# Patient Record
Sex: FEMALE | Race: BLACK OR AFRICAN AMERICAN | NOT HISPANIC OR LATINO
[De-identification: names, ages, dates, MRNs, and addresses within clinical notes are randomized per-mention and may not be internally consistent; named-entity substitution may affect disease eponyms.]

---

## 2019-07-21 ENCOUNTER — RESULT REVIEW (OUTPATIENT)
Age: 54
End: 2019-07-21

## 2019-09-18 ENCOUNTER — TRANSCRIPTION ENCOUNTER (OUTPATIENT)
Age: 54
End: 2019-09-18

## 2020-04-26 ENCOUNTER — MESSAGE (OUTPATIENT)
Age: 55
End: 2020-04-26

## 2020-05-08 ENCOUNTER — APPOINTMENT (OUTPATIENT)
Dept: DISASTER EMERGENCY | Facility: CLINIC | Age: 55
End: 2020-05-08

## 2020-05-09 ENCOUNTER — TRANSCRIPTION ENCOUNTER (OUTPATIENT)
Age: 55
End: 2020-05-09

## 2020-05-09 LAB
SARS-COV-2 IGG SERPL IA-ACNC: 103 INDEX
SARS-COV-2 IGG SERPL QL IA: POSITIVE

## 2021-08-18 PROBLEM — Z00.00 ENCOUNTER FOR PREVENTIVE HEALTH EXAMINATION: Status: ACTIVE | Noted: 2021-08-18

## 2022-06-06 ENCOUNTER — RESULT REVIEW (OUTPATIENT)
Age: 57
End: 2022-06-06

## 2023-06-19 ENCOUNTER — NON-APPOINTMENT (OUTPATIENT)
Age: 58
End: 2023-06-19

## 2023-06-19 ENCOUNTER — APPOINTMENT (OUTPATIENT)
Dept: ORTHOPEDIC SURGERY | Facility: CLINIC | Age: 58
End: 2023-06-19
Payer: COMMERCIAL

## 2023-06-19 VITALS — WEIGHT: 159 LBS | BODY MASS INDEX: 28.17 KG/M2 | HEIGHT: 63 IN

## 2023-06-19 DIAGNOSIS — M25.462 EFFUSION, LEFT KNEE: ICD-10-CM

## 2023-06-19 DIAGNOSIS — M25.569 PAIN IN UNSPECIFIED KNEE: ICD-10-CM

## 2023-06-19 PROCEDURE — 99204 OFFICE O/P NEW MOD 45 MIN: CPT

## 2023-06-19 PROCEDURE — 73560 X-RAY EXAM OF KNEE 1 OR 2: CPT | Mod: LT

## 2023-06-19 RX ORDER — MELOXICAM 15 MG/1
15 TABLET ORAL DAILY
Qty: 30 | Refills: 1 | Status: ACTIVE | COMMUNITY
Start: 2023-06-19 | End: 1900-01-01

## 2023-06-19 NOTE — DISCUSSION/SUMMARY
[de-identified] : 58-year-old female with acute left knee injury with moderate effusion and significant mechanical symptoms.  There is mild degenerative joint arthritis on x-ray.  I suggested we obtain an MRI of the left knee for further evaluation.  If meniscus tear is found, consideration will be given towards arthroscopy.  I recommended a course of physical therapy, and referral was given.  She may continue with the ice, bracing as needed, Tylenol or anti-inflammatories as needed.  Discussed possible cortisone injection at follow-up visits.\par Follow-up in 6 weeks

## 2023-06-19 NOTE — PHYSICAL EXAM
[Normal] : Gait: normal [de-identified] : General: No acute distress\par Mental: Alert and oriented x3\par Eyes: Conjunctivitis not seen\par Chest: Symmetric chest rise, no audible wheezing\par Skin: Bilateral lower extremities absent from rashes and ulcers\par Abdomen: No distention\par \par Left knee:\par Skin: Clean, dry, intact \par Inspection: No obvious malalignment, no masses, no swelling, moderate effusion.  Posterior swelling.\par Tenderness: Positive MJLT. no LJLT.  Posterior tenderness.  No tenderness over the medial and lateral patella facets. No ttp medial/lateral epicondyle, patella tendon, tibial tubercle, pes anserinus, or proximal fibula. \par ROM: 0 to 120° pain with deep flexion\par Stability: Stable to varus and valgus, negative lachman. Negative anterior/posterior drawer. \par Additional tests: Positive McMurrays test, positive Steinmann, Negative patellar grind test.  \par Strength: 5/5 Q/H/TA/GS/EHL, no atrophy \par Neuro: Sensation intact to light touch throughout in dp/sp/tib/gilda/saph nerve distributions\par Pulses: 2+ DP/PT pulses.\par  [de-identified] : Left knee x-rays performed at [facility] urgent care on 6/11 include AP, lateral, and oblique.\par Images show neutral alignment with mild narrowing of the medial joint space and small medial osteophytes, well-maintained lateral joint space, patella at appropriate height.\par Merchant view of the left knee today shows a patella in a central position with patellofemoral narrowing.

## 2023-06-19 NOTE — HISTORY OF PRESENT ILLNESS
[de-identified] : 58-year-old female presents with severe left knee pain over the past 1 week.  This began when she was jumping cheering on her son during a basketball game.  She felt the left knee give out, and developed acute onset swelling.  She has pain along the medial and posterior aspects, associated with buckling and catching sensations.  There is persistent swelling.  She has tried applying ice and using a knee brace.  Pain is increased with walking and prolonged sitting.  There is no pain radiating towards the feet, and there is no numbness, tingling, or weakness.  She went to [facility] urgent care on 6/11 where x-rays of the knee were taken.

## 2023-06-20 PROBLEM — Z00.00 ENCOUNTER FOR PREVENTIVE HEALTH EXAMINATION: Noted: 2023-06-20

## 2023-06-23 ENCOUNTER — RESULT REVIEW (OUTPATIENT)
Age: 58
End: 2023-06-23

## 2023-06-23 ENCOUNTER — OUTPATIENT (OUTPATIENT)
Dept: OUTPATIENT SERVICES | Facility: HOSPITAL | Age: 58
LOS: 1 days | End: 2023-06-23
Payer: COMMERCIAL

## 2023-06-23 ENCOUNTER — APPOINTMENT (OUTPATIENT)
Dept: MRI IMAGING | Facility: IMAGING CENTER | Age: 58
End: 2023-06-23
Payer: COMMERCIAL

## 2023-06-23 DIAGNOSIS — M25.569 PAIN IN UNSPECIFIED KNEE: ICD-10-CM

## 2023-06-23 PROCEDURE — 73721 MRI JNT OF LWR EXTRE W/O DYE: CPT | Mod: 26,LT

## 2023-06-23 PROCEDURE — 73721 MRI JNT OF LWR EXTRE W/O DYE: CPT

## 2023-06-28 ENCOUNTER — APPOINTMENT (OUTPATIENT)
Dept: ORTHOPEDIC SURGERY | Facility: CLINIC | Age: 58
End: 2023-06-28
Payer: COMMERCIAL

## 2023-06-28 PROCEDURE — 99441: CPT

## 2023-07-19 ENCOUNTER — APPOINTMENT (OUTPATIENT)
Dept: ORTHOPEDIC SURGERY | Facility: CLINIC | Age: 58
End: 2023-07-19
Payer: COMMERCIAL

## 2023-07-19 VITALS — BODY MASS INDEX: 28.88 KG/M2 | WEIGHT: 163 LBS | HEIGHT: 63 IN

## 2023-07-19 DIAGNOSIS — S83.242A OTHER TEAR OF MEDIAL MENISCUS, CURRENT INJURY, LEFT KNEE, INITIAL ENCOUNTER: ICD-10-CM

## 2023-07-19 DIAGNOSIS — S83.512A SPRAIN OF ANTERIOR CRUCIATE LIGAMENT OF LEFT KNEE, INITIAL ENCOUNTER: ICD-10-CM

## 2023-07-19 PROCEDURE — 99214 OFFICE O/P EST MOD 30 MIN: CPT

## 2023-07-20 NOTE — DISCUSSION/SUMMARY
[de-identified] : 58-year-old female 1 month status post pivot injury with a left knee anterior cruciate ligament tear.  We discussed treatment options at length.  We discussed nonoperative care with activity modification physical therapy.  We discussed surgical treatment with anterior cruciate ligament reconstruction.  Given her age and activity level would like to proceed with attempted nonoperative management.  She will start a course of physical therapy at this time.  She will follow-up in 6 to 8 weeks.  If not improved we will again discuss the possibility of ACL reconstruction.  She expressed agreement with the plan

## 2023-07-20 NOTE — CONSULT LETTER
[Dear  ___] : Dear  [unfilled], [Consult Letter:] : I had the pleasure of evaluating your patient, [unfilled]. [Please see my note below.] : Please see my note below. [Consult Closing:] : Thank you very much for allowing me to participate in the care of this patient.  If you have any questions, please do not hesitate to contact me. [Sincerely,] : Sincerely, [FreeTextEntry3] : DR. [facility]

## 2023-07-20 NOTE — PHYSICAL EXAM
[de-identified] : General Exam\par \par Well developed, well nourished\par No apparent distress\par Oriented to person, place, and time\par Mood: Normal\par Affect: Normal\par Balance and coordination: Normal\par Gait: Normal\par \par left knee exam\par \par Skin: Clean, dry, intact\par Inspection: No obvious malalignment, no masses, no swelling, mild effusion.\par Tenderness: + MJLT. No LJLT. No tenderness over the medial and lateral patella facets. No ttp medial/lateral epicondyle, patella tendon, tibial tubercle, pes anserinus, or proximal fibula.\par ROM: 0 to 140° no pain with deep flexion\par Stability: Stable to varus, valgus, 2+ ant drawer 2b lachman pivot glide\par Additional tests: Negative McMurrays test, Negative patellar grind test. \par Strength: 5/5 Q/H/TA/GS/EHL, no atrophy\par Neuro: In tact to light touch throughout in dp/sp/tib/gilda/saph nerve districutions, DTR's normal\par Pulses: 2+ DP/PT pulses.\par  [de-identified] : Radiographs and MRI obtained previously were reviewed.  There is a full-thickness tear of the anterior cruciate ligament.  Bone contusions consistent with pivot shift.  Complex tear medial meniscus.  Medial compartment arthritis